# Patient Record
Sex: FEMALE | Race: WHITE | URBAN - METROPOLITAN AREA
[De-identification: names, ages, dates, MRNs, and addresses within clinical notes are randomized per-mention and may not be internally consistent; named-entity substitution may affect disease eponyms.]

---

## 2018-01-06 ENCOUNTER — HOSPITAL ENCOUNTER (EMERGENCY)
Facility: CLINIC | Age: 33
End: 2018-01-06

## 2018-01-06 ENCOUNTER — HOSPITAL ENCOUNTER (EMERGENCY)
Facility: CLINIC | Age: 33
Discharge: NURSING, REHAB OR LONG-TERM CARE | End: 2018-01-07
Attending: EMERGENCY MEDICINE | Admitting: EMERGENCY MEDICINE
Payer: COMMERCIAL

## 2018-01-06 DIAGNOSIS — F10.920 ALCOHOLIC INTOXICATION WITHOUT COMPLICATION (H): ICD-10-CM

## 2018-01-06 LAB
ALBUMIN SERPL-MCNC: 3.4 G/DL (ref 3.4–5)
ALBUMIN UR-MCNC: NEGATIVE MG/DL
ALP SERPL-CCNC: 60 U/L (ref 40–150)
ALT SERPL W P-5'-P-CCNC: 18 U/L (ref 0–50)
AMPHETAMINES UR QL SCN: NEGATIVE
ANION GAP SERPL CALCULATED.3IONS-SCNC: 7 MMOL/L (ref 3–14)
APAP SERPL-MCNC: <2 MG/L (ref 10–20)
APPEARANCE UR: CLEAR
AST SERPL W P-5'-P-CCNC: 15 U/L (ref 0–45)
BARBITURATES UR QL: NEGATIVE
BASOPHILS # BLD AUTO: 0 10E9/L (ref 0–0.2)
BASOPHILS NFR BLD AUTO: 0.2 %
BENZODIAZ UR QL: NEGATIVE
BILIRUB SERPL-MCNC: 0.1 MG/DL (ref 0.2–1.3)
BILIRUB UR QL STRIP: NEGATIVE
BUN SERPL-MCNC: 10 MG/DL (ref 7–30)
CALCIUM SERPL-MCNC: 8 MG/DL (ref 8.5–10.1)
CANNABINOIDS UR QL SCN: NEGATIVE
CHLORIDE SERPL-SCNC: 112 MMOL/L (ref 94–109)
CO2 SERPL-SCNC: 26 MMOL/L (ref 20–32)
COCAINE UR QL: NEGATIVE
COLOR UR AUTO: ABNORMAL
CREAT SERPL-MCNC: 0.64 MG/DL (ref 0.52–1.04)
DIFFERENTIAL METHOD BLD: NORMAL
EOSINOPHIL # BLD AUTO: 0.1 10E9/L (ref 0–0.7)
EOSINOPHIL NFR BLD AUTO: 2 %
ERYTHROCYTE [DISTWIDTH] IN BLOOD BY AUTOMATED COUNT: 11.9 % (ref 10–15)
ETHANOL SERPL-MCNC: 0.43 G/DL
GFR SERPL CREATININE-BSD FRML MDRD: ABNORMAL ML/MIN/1.7M2
GLUCOSE SERPL-MCNC: 123 MG/DL (ref 70–99)
GLUCOSE UR STRIP-MCNC: NEGATIVE MG/DL
HCG UR QL: NEGATIVE
HCT VFR BLD AUTO: 39.6 % (ref 35–47)
HGB BLD-MCNC: 13.7 G/DL (ref 11.7–15.7)
HGB UR QL STRIP: NEGATIVE
IMM GRANULOCYTES # BLD: 0 10E9/L (ref 0–0.4)
IMM GRANULOCYTES NFR BLD: 0.3 %
KETONES UR STRIP-MCNC: NEGATIVE MG/DL
LACTATE BLD-SCNC: 1.9 MMOL/L (ref 0.7–2)
LEUKOCYTE ESTERASE UR QL STRIP: NEGATIVE
LYMPHOCYTES # BLD AUTO: 2.3 10E9/L (ref 0.8–5.3)
LYMPHOCYTES NFR BLD AUTO: 35.1 %
MAGNESIUM SERPL-MCNC: 2.6 MG/DL (ref 1.6–2.3)
MCH RBC QN AUTO: 32.8 PG (ref 26.5–33)
MCHC RBC AUTO-ENTMCNC: 34.6 G/DL (ref 31.5–36.5)
MCV RBC AUTO: 95 FL (ref 78–100)
MONOCYTES # BLD AUTO: 0.3 10E9/L (ref 0–1.3)
MONOCYTES NFR BLD AUTO: 4.3 %
NEUTROPHILS # BLD AUTO: 3.8 10E9/L (ref 1.6–8.3)
NEUTROPHILS NFR BLD AUTO: 58.1 %
NITRATE UR QL: NEGATIVE
NRBC # BLD AUTO: 0 10*3/UL
NRBC BLD AUTO-RTO: 0 /100
OPIATES UR QL SCN: NEGATIVE
PCP UR QL SCN: NEGATIVE
PH UR STRIP: 5.5 PH (ref 5–7)
PLATELET # BLD AUTO: 251 10E9/L (ref 150–450)
POTASSIUM SERPL-SCNC: 3.2 MMOL/L (ref 3.4–5.3)
PROT SERPL-MCNC: 6.5 G/DL (ref 6.8–8.8)
RBC # BLD AUTO: 4.18 10E12/L (ref 3.8–5.2)
RBC #/AREA URNS AUTO: <1 /HPF (ref 0–2)
SALICYLATES SERPL-MCNC: <2 MG/DL
SODIUM SERPL-SCNC: 145 MMOL/L (ref 133–144)
SOURCE: ABNORMAL
SP GR UR STRIP: 1 (ref 1–1.03)
UROBILINOGEN UR STRIP-MCNC: NORMAL MG/DL (ref 0–2)
WBC # BLD AUTO: 6.5 10E9/L (ref 4–11)
WBC #/AREA URNS AUTO: 0 /HPF (ref 0–2)

## 2018-01-06 PROCEDURE — 80053 COMPREHEN METABOLIC PANEL: CPT | Performed by: EMERGENCY MEDICINE

## 2018-01-06 PROCEDURE — 80320 DRUG SCREEN QUANTALCOHOLS: CPT | Performed by: EMERGENCY MEDICINE

## 2018-01-06 PROCEDURE — 85025 COMPLETE CBC W/AUTO DIFF WBC: CPT | Performed by: EMERGENCY MEDICINE

## 2018-01-06 PROCEDURE — 25000128 H RX IP 250 OP 636: Performed by: EMERGENCY MEDICINE

## 2018-01-06 PROCEDURE — 80329 ANALGESICS NON-OPIOID 1 OR 2: CPT | Performed by: EMERGENCY MEDICINE

## 2018-01-06 PROCEDURE — 93005 ELECTROCARDIOGRAM TRACING: CPT

## 2018-01-06 PROCEDURE — 99292 CRITICAL CARE ADDL 30 MIN: CPT

## 2018-01-06 PROCEDURE — 99291 CRITICAL CARE FIRST HOUR: CPT | Mod: 25

## 2018-01-06 PROCEDURE — 96361 HYDRATE IV INFUSION ADD-ON: CPT

## 2018-01-06 PROCEDURE — 82075 ASSAY OF BREATH ETHANOL: CPT

## 2018-01-06 PROCEDURE — 96375 TX/PRO/DX INJ NEW DRUG ADDON: CPT

## 2018-01-06 PROCEDURE — 25000125 ZZHC RX 250: Performed by: EMERGENCY MEDICINE

## 2018-01-06 PROCEDURE — 83605 ASSAY OF LACTIC ACID: CPT | Performed by: EMERGENCY MEDICINE

## 2018-01-06 PROCEDURE — 83735 ASSAY OF MAGNESIUM: CPT | Performed by: EMERGENCY MEDICINE

## 2018-01-06 PROCEDURE — 96365 THER/PROPH/DIAG IV INF INIT: CPT

## 2018-01-06 PROCEDURE — 81001 URINALYSIS AUTO W/SCOPE: CPT | Performed by: EMERGENCY MEDICINE

## 2018-01-06 RX ORDER — NALOXONE HYDROCHLORIDE 1 MG/ML
2 INJECTION INTRAMUSCULAR; INTRAVENOUS; SUBCUTANEOUS ONCE
Status: COMPLETED | OUTPATIENT
Start: 2018-01-06 | End: 2018-01-06

## 2018-01-06 RX ORDER — FLUMAZENIL 0.1 MG/ML
0.5 INJECTION, SOLUTION INTRAVENOUS ONCE
Status: COMPLETED | OUTPATIENT
Start: 2018-01-06 | End: 2018-01-06

## 2018-01-06 RX ORDER — ONDANSETRON 2 MG/ML
INJECTION INTRAMUSCULAR; INTRAVENOUS
Status: DISCONTINUED
Start: 2018-01-06 | End: 2018-01-06 | Stop reason: HOSPADM

## 2018-01-06 RX ORDER — ONDANSETRON 2 MG/ML
4 INJECTION INTRAMUSCULAR; INTRAVENOUS ONCE
Status: COMPLETED | OUTPATIENT
Start: 2018-01-06 | End: 2018-01-06

## 2018-01-06 RX ORDER — POTASSIUM CL/LIDO/0.9 % NACL 10MEQ/0.1L
10 INTRAVENOUS SOLUTION, PIGGYBACK (ML) INTRAVENOUS ONCE
Status: COMPLETED | OUTPATIENT
Start: 2018-01-06 | End: 2018-01-06

## 2018-01-06 RX ORDER — NALOXONE HYDROCHLORIDE 1 MG/ML
INJECTION INTRAMUSCULAR; INTRAVENOUS; SUBCUTANEOUS
Status: DISCONTINUED
Start: 2018-01-06 | End: 2018-01-06 | Stop reason: HOSPADM

## 2018-01-06 RX ORDER — SODIUM CHLORIDE 9 MG/ML
1000 INJECTION, SOLUTION INTRAVENOUS CONTINUOUS
Status: DISCONTINUED | OUTPATIENT
Start: 2018-01-06 | End: 2018-01-07 | Stop reason: HOSPADM

## 2018-01-06 RX ADMIN — SODIUM CHLORIDE 1000 ML: 9 INJECTION, SOLUTION INTRAVENOUS at 18:26

## 2018-01-06 RX ADMIN — ONDANSETRON 4 MG: 2 INJECTION INTRAMUSCULAR; INTRAVENOUS at 18:53

## 2018-01-06 RX ADMIN — Medication 10 MEQ: at 20:27

## 2018-01-06 RX ADMIN — NALOXONE HYDROCHLORIDE 2 MG: 1 INJECTION PARENTERAL at 18:18

## 2018-01-06 RX ADMIN — SODIUM CHLORIDE 1000 ML: 9 INJECTION, SOLUTION INTRAVENOUS at 18:41

## 2018-01-06 RX ADMIN — FLUMAZENIL 0.5 MG: 0.1 INJECTION, SOLUTION INTRAVENOUS at 18:24

## 2018-01-06 NOTE — ED AVS SNAPSHOT
` ` Patient Information     Patient Name Sex     Delaney Rodriges ([id number]) Female 1985       Room Bed    BH2 BH2      Patient Demographics     Address Phone    [address] [place] [id number] [phone] (Home)  NONE (Work)  [phone] (Mobile) *Preferred*      Patient Ethnicity & Race     Ethnic Group Patient Race    American White      PCP and Center    Primary Care Provider  Phone Center     Valorie Dos Santos  [phone] Ofelia Feliz [address] [address]        Emergency Contact(s)     Name Relation Home Work Mobile    NANCY [facility] Friend [phone] NONE [phone]      Documents on File        Status Date Received Description       Documents for the Patient    Consent for EHR Access Received 18     Insurance Card       External Medication Information Consent       Patient ID       [facility] Specified Other       Consent for Services/Privacy Notice - Hospital/Clinic Received 18     Privacy Notice - [place] Received 18     Care Everywhere Prospective Auth Received 18        Documents for the Encounter    CMS IM for Patient Signature         Admission Information     Attending Provider Admitting Provider Admission Type Admission Date/Time    Mehreen Daley MD  Emergency 18  1819    Discharge Date Hospital Service Auth/Cert Status Service Area     Emergency Medicine [facility]    Unit Room/Bed Admission Status        EMERGENCY DEPT BH2/2 Admission (Confirmed)       Admission     Complaint    None      Hospital Account     Name Acct ID Class Status Primary Coverage    Delaney Rodriges [id number] Emergency [facility] access hospital OPEN ACCESS FULLY INSURED            Guarantor Account (for Hospital Account #[id number])     Name Relation to Pt Service Area Active? Acct Type    Delaney Rodriges Self FCS Yes Personal/Family    Address Phone          [address] [place] [id number] [phone](H)              Coverage Information  (for Hospital Account #[id number])     F/O Payor/Plan Precert #    [employer] OPEN ACCESS FULLY INSURED     Subscriber Subscriber #    Delaney Rodriges [id number]    Address Phone    [address]  [place] [address] [phone]

## 2018-01-07 VITALS
BODY MASS INDEX: 21.25 KG/M2 | HEIGHT: 63 IN | SYSTOLIC BLOOD PRESSURE: 101 MMHG | OXYGEN SATURATION: 96 % | TEMPERATURE: 97.8 F | WEIGHT: 119.93 LBS | RESPIRATION RATE: 19 BRPM | DIASTOLIC BLOOD PRESSURE: 64 MMHG

## 2018-01-07 NOTE — ED NOTES
DATE:  1/6/2018   TIME OF RECEIPT FROM LAB:  8:13 PM  LAB TEST:  ETOH  LAB VALUE:  0.43  RESULTS GIVEN WITH READ-BACK TO (PROVIDER):  Ana Ambriz MD  TIME LAB VALUE REPORTED TO PROVIDER:   8:13 PM

## 2018-01-07 NOTE — ED NOTES
"[place] [employer] requesting pt's name and birth date. Writer asked pt if she was okay with them receiving that information and she nodded her head yes, and states \"sure.\"   "

## 2018-01-07 NOTE — ED NOTES
Patient placed on the list at [address] for DETOX.  Pt given the option of DETOX or calling sister now to arrange a ride home.  Pt made aware by writer she was on the list for transfer.  Pt agreed to call sister.  Writer assisted with phone call.

## 2018-01-07 NOTE — ED NOTES
"Writer woke patient to ambulate and discuss DC plan.  Patient states she is staying with her sister in [place] who she thinks called 911.  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  Pt recently went through treatment in [place]. Pt off monitor and ambulated independently to the bathroom with escort by RN for safety.  Gait slightly unsteady. Given pitcher of ice water, picking at breakfast, and laid back down.  Pt agitated when writer discussed her calling her sister for a ride, \"I won't be able to do that until later today\" Writer clarified we can't board patients in the ED all day and pt ignored the statement and refused to call sister at this time.  Dr. Daley notified.    "

## 2018-01-07 NOTE — ED NOTES
Writer walked into room and introduced self to patient. She woke easily.to verbal stimulus.  Clarified with patient plan for DC with sober ride home. Patient in agreement.  Resting and set up with hot breakfast

## 2018-01-07 NOTE — ED PROVIDER NOTES
Sign Out Note    Pt accepted in sign out from: Dr. Ambriz    Briefly pt presented to the ED for: etoh    Plan at time of sign out: await sobriety    Care of patient during my shift: no issues. Pt was taken out of restraints before 2am. Pt cooperative. Pt much more sober and cooperative. Denies any SI or depression.    Plan for patient at this time: await sobriety. Pt will be breathalyzed at 630 by RN and if sober can be discharged. Care of pt will be signed out to Dr. Daley, morning physician.     Joel Arreola, DO  01/07/18 0428

## 2018-01-07 NOTE — ED PROVIDER NOTES
Received patient in sign out.  Patient with independent gait but mildly unsteady.  Patient sister refuses to provide a safe sober ride.  Patient will be sent to [address] [place] [address] for detox.       Mehreen Daley MD  01/07/18 0946

## 2018-01-07 NOTE — ED NOTES
"Patient waking up and pulling at tubes. Patient redirected and reoriented on location. Patient states her name is Delaney and states \"Fuck you\" when asked what her last name is. Bilateral soft restraints applied to both wrists. Dr. Ambriz at bedside.  "

## 2018-01-07 NOTE — ED NOTES
Patient continuing to struggle against soft restraints and attempting to kick staff and get up. Dr. Ambriz ordered restraints violent or self destructive adult.

## 2018-01-07 NOTE — DISCHARGE INSTRUCTIONS
"  Alcohol Intoxication  Alcohol intoxication occurs when you drink alcohol faster than your liver can remove it from your system. The following facts are important to remember:    It can take 10 minutes or more to start to feel the effects of a drink, so you can easily get more intoxicated than you intended.    One drink may be more than 1 serving of alcohol. Depending on the drink, it can be 2 to 4 servings.    It takes about an hour for your body to metabolize (clear) 1 serving. If you have more than 1 drink, it can take a couple of hours or more.    Many things affect how drinks will affect you, including whether you ve eaten, how fast you drink, your size, how much you normally drink (or not), medicines you take, chronic diseases you have, and gender.  Signs and symptoms of alcohol poisoning  The following are signs and symptoms of alcohol poisoning:  Mild impairment    Reduced inhibitions    Slurred speech    Drowsiness    Decreased fine motor skills  Moderate impairment    Erratic behavior, aggression, depression    Impaired judgment    Confusion    Concentration difficulties    Coordination problems  Severe impairment    Vomiting    Seizures    Unconsciousness    Cold, clammy    Slow or irregular breathing    Hypothermia (low body temperature)    Coma  Health effects  Alcohol abuse causes health problems. Sometimes this can happen after only drinking a  little.\" There is no set number of drinks or amount of alcohol that defines too much. The more you drink at one time, and the more frequently you drink determine both the short-term and long-term health effects. It affects all parts of your body and your health, including your:    Brain. Alcohol is a central nervous system depressant. It can damage parts of the brain that affect your balance, memory, thinking, and emotions. It can cause memory loss, blackouts, depression, agitation, sleep cycle changes, and seizures. These changes may or may not be " reversible.    Heart and vascular system. Alcohol affects multiple areas. It can damage heart muscle causing cardiomyopathy, which is a weakening and stretching of the heart muscle. This can lead to trouble breathing, an irregular heartbeat, atrial fibrillation, leg swelling, and heart failure. It makes the blood vessels stiffen causing hypertension (high blood pressure). All of these problems increase your risk of having heart attacks or strokes.    Liver. Alcohol causes fat to build up in the liver, affecting its normal function. This increases the risk for hepatitis, leading to abdominal pain, appetite loss, jaundice, bleeding problems, liver fibrosis, and cirrhosis. This in turn can affect your ability to fight off infections, and can cause diabetes. The liver changes prevent it from removing toxins in your blood that can cause encephalopathy. Signs of this are confusion, altered level of consciousness, personality changes, memory loss, seizures, coma, and death.    Pancreas. Alcohol can cause inflammation of the pancreas, or pancreatitis. This can cause pain in your abdomen, fever, and diabetes.    Immune system. Alcohol weakens your immune system in a number of ways. It suppresses your immune system making it harder to fight off infections and colds. You will also have a higher risk of certain infections like pneumonia and tuberculosis.    Cancer risk. Alcohol raises your risk of cancer of the mouth, esophagus, pharynx, larynx, liver, and breast.    Sexual function. Alcohol abuse can also lead to sexual problems.  Alcohol use during pregnancy may cause permanent damage to the growing baby.  Home care  The following guidelines will help you care for yourself at home:    Don't drink any more alcohol.    Don't drive until all effects of the alcohol have worn off.    Don't operate machinery that can cause injuries.    Get lots of rest over the next few days. Drink plenty of water and other non-alcoholic liquids.  Try to eat regular meals.    If you have been drinking heavily on a daily basis, you may go through alcohol withdrawal. The usual symptoms last 3 to 4 days and may include nervousness, shakiness, nausea, sweating, sleeplessness, and can even cause seizures and a serious withdrawal symptom called delirium tremens, or DTs. During this time, it is best that you stay with family or friends who can help and support you. You can also admit yourself to a residential detox program. If your symptoms are severe (seizures, severe shakiness, confusion), contact your doctor or call an ambulance for help (see below).   Follow-up care  If alcohol is a problem in your life, these are some organizations that can help you:    Alcoholics Anonymous offers support through a self-help fellowship. There are no dues or fees. See the Yellow Pages and call for time and place of meetings. Find AA online at www.aa.org.    [employer] offers support to families of alcohol users. Contact 360-894-0787, or online at www.al-anolatoya.org.    National [place] on Alcoholism and Drug Dependence can be reached at 850-515-3615, or online at www.ncadd.org.    There are also inpatient and residential alcohol detox programs. Check the Internet or phonebook Yellow Pages under  Drug Abuse and Treatment Centers.   Call 911  Call 911 if any of these occur:    Trouble breathing or slow irregular breathing    Chest pain    Sudden weakness on one side of your body or sudden trouble speaking    Heavy bleeding or vomiting blood    Very drowsy or trouble awakening    Fainting or loss of consciousness    Rapid heart rate    Seizure  When to seek medical advice  Call your healthcare provider right away if any of these occur:    Severe shakiness     Fever over 100.4  F (38.0  C)    Confusion or hallucinations (seeing, hearing, or feeling things that are not there)    Pain in your upper abdomen that gets worse    Repeated vomiting  Date Last Reviewed: 6/1/2016 2000-2017 The  [employer]. [address] [place] [address], [place] [address]. All rights reserved. This information is not intended as a substitute for professional medical care. Always follow your healthcare professional's instructions.

## 2018-01-07 NOTE — ED PROVIDER NOTES
"  History     Chief Complaint:  Altered Mental Status    The history is provided by the EMS personnel. The history is limited by the condition of the patient (unconscious).      \"Erin Jama\" is a ~29 year old female who presents via EMS as an unconscious Erin Jama. EMS report that the patient walked into the 2nd floor apartment of strangers, began taking off her clothes, and proceeded to lose consciousness. EMS state that the non-acquaintances who found her were moving a TV into their apartment and left the door opening, inadvertently allowing the patient to enter. EMS note that she did not appear to have been outside prior to walking into the apartment as she was notably warm to the touch. EMS note that she smelled a \"little alcoholy\" on arrival. Police were notified.    Allergies:  Unable to verify    Medications:    Unable to verify    Past Medical History:    Unable to verify    Past Surgical History:    Unable to verify    Family History:    Unable to verify    Social History:  Presents via EMS  PCP: No primary care provider on file.       Review of Systems   Unable to perform ROS: Mental status change       Physical Exam     Patient Vitals for the past 24 hrs:   BP Temp Temp src Heart Rate Resp SpO2   01/06/18 1836 - - - 78 24 94 %   01/06/18 1831 - - - 90 27 96 %   01/06/18 1830 98/55 - - 75 24 98 %   01/06/18 1829 - - - 74 24 98 %   01/06/18 1828 99/57 97.8  F (36.6  C) Temporal 74 25 98 %        Physical Exam     Nursing note and vitals reviewed.  General: Patient is obtunded but opening her eyes and moving all 4 extremities.  HEENT: No evidence of trauma to her face or scalp.  No blood from the nose or ears.  Eyes: Pupils are equal and reactive, not small.  She has a scar on her left eyelid.  Musculoskeletal: Well-developed muscularly, no edema, no obvious bruising or evidence of trauma, no tract marks.  Neuro: GCS of 10, opens her eyes spontaneously, localizes pain, will not talk.  Smells of alcohol.  Moves " all extremities equally and has good strength.  Skin: No bruising, no scrapes, no jaundice, no IV drug tracts.  Pulmonary: Lungs are clear.  Moves air well, saturations are normal.  Cardiovascular: Heart sounds are normal, strong radial pulses, normal capillary refill, no peripheral cyanosis.  No chest wall tenderness.  Abdomen: No tenderness or distention, no organomegaly.    Emergency Department Course     ECG (18:19:51):  Rate 86 bpm. MS interval 136. QRS duration 88. QT/QTc 372/445. P-R-T axes 26 35 43. Normal sinus rhythm. Normal ECG. Agree with computer interpretation. Interpreted at 1820 by Ana Ambriz MD.     Laboratory:  CBC: AWNL (WBC 6.5, HGB 13.7, )  CMP: Na 145 (H), Potassium 3.2 (L), Cl 112 (H),  (H), Ca 8.0 (L) o/w WNL (Creatinine 0.64)  Lactic Acid: 1.9  Magnesium: 2.6 (H)  Salicylate: <2  Acetaminophen: <2  EtOH: 0.43 (HH)    Drug abuse screen 77 urine: Negative  HCG Qualitative Urine: Negative  UA with micro: Spec Grav 1.001 (L) o/w negative      Interventions:  Medications   0.9% sodium chloride BOLUS (0 mLs Intravenous Stopped 1/6/18 2003)     Followed by   0.9% sodium chloride infusion (1,000 mLs Intravenous Not Given 1/6/18 1841)   naloxone (NARCAN) injection 2 mg ( Intravenous Canceled Entry 1/6/18 1819)   flumazenil (ROMAZICON) injection 0.5 mg (0.5 mg Intravenous Given 1/6/18 1824)   0.9% sodium chloride BOLUS (0 mLs Intravenous Stopped 1/6/18 2004)   ondansetron (ZOFRAN) injection 4 mg (4 mg Intravenous Given 1/6/18 1853)   potassium chloride 10 mEq in 100 mL intermittent infusion with 10 mg lidocaine (0 mEq Intravenous Stopped 1/6/18 2127)     1818: Naloxone 2 mg IV  1824: Romazicon 0.5 mg IV   1826: NS 1L IV Bolus    1841: NS 1L IV Bolus    1853: Zofran 4 mg IV  2027: Potassium chloride 10 mEq in 10 mL II w/ 10 mg Lidocaine   The patient's symptoms were not improved with parenteral Narcan or Romazicon.    Emergency Department Course:  Past medical records, nursing  notes, and vitals reviewed.    1814: Presents with EMS.  1815: Moved feet, opened eyes.  1817: I entered the room and attempted to rouse the patient unsuccessfully.  1817: I ordered 2mL Narcan administration followed by 0.5 mg Romazicon.  1818: Pupils were found to be reactive.  1819: EKG taken.  1819: I smelled alcohol on the patient's breath.  1824: Romazicon 0.5 mg IV administered  1829: I rechecked the patient. No update.  1830: Blood drawn for testing.  1836: Urine collected for urinalysis  1841: Zofran 4 mg IV administered  1851: I rechecked the patient. Patient is somewhat conscious but unable to speak.  1854: Patient waking up and pulling at tubes and combative. Patient states her name is Delaney.  1900: Patient evaluated face to face, is a danger to self and staff. Therefore restraints ordered.  1913: Patient transferred to  room 2.  1941: Blood drawn for Salicylate and Acetaminophen labs  2023: I rechecked the patient. Patient remains unable to speak.  2200: Checked on patient status, restraints remain necessary for patient safety.  2349: Checked on patient status, restraints remain necessary for patient safety.      Impression & Plan         Medical Decision Making:  Patient comes into the stabilization room after some bizarre behavior at an apartment complex and altered mental status.  She will open her eyes but will not talk.  She smells strongly of alcohol.  She was given Narcan after arrival and Romazicon without any benefit.  Labs were drawn and a urine specimen obtained.  She was oxygenating well, her GCS was 10 when she came in mainly because she was not saying anything.  She opened her eyes spontaneously, she localized pain and she was moving around on the bed.  Her blood alcohol did come back at 0.43, potassium was low at 3.2 and her lactic acid was normal at 1.9.  Salicylate Tylenol levels were negligible.  She was given a liter of normal saline and then 10 mEq of IV potassium because of her low  potassium.  She was placed in 5 point restraints after arrival because she was trying to pull off her clothes and pull her IV out.  She was given a liter of normal saline and moved to a behavioral health bed.  I checked on her regularly and she started waking up more.  She had to go to the bathroom and was assisted there but still could not walk on her own.  I kept her in 5 point restraints because of her risk of trying to get out of bed and falling and hurting herself.  Until she betzaida up enough to be able to talk and follow commands, she will stay in restraints.  Her drug screen came back negative and pregnancy test negative.  CBC is normal.  I will sign her out to my partner for overnight so she will board here.  She will need to be evaluated every 3-4 hours and eventually will be able to come out of her restraints.  I reevaluated the patient and now she gave me her name and birthdate.  She lives in [place] which is near [place].  She still is not safe to be out of restraints as she does not have any insight as to why she is here and I am concerned she could fall and injure herself.  She does state she was not intentionally drinking to kill herself but this will need to be reevaluated as she betzaida up even more overnight.  Critical Care time was 70 minutes for this patient excluding procedures.    Diagnosis:    ICD-10-CM    1. Alcoholic intoxication without complication (H) F10.920        Disposition:  Patient will board overnight to sober up. Dr. Arreola will periodically evaluate her and determine her need for restraints.  When she is more awake, will need to make sure that she is not suicidal and intending self-harm with the alcohol.    Discharge Medications:  New Prescriptions    No medications on file       I, Kerwin Callejas, am serving as a scribe at 6:12 PM on 1/6/2018 to document services personally performed by Ana Ambriz MD based on my observations and the provider's statements to me.     1/6/2018    EMERGENCY DEPARTMENT     Ana Ambriz MD  01/07/18 0013

## 2018-01-07 NOTE — ED NOTES
Pt has been resting comfortably, repositions self independently.  On transport hold. VSS Waiting HE for transfer to [facility]OX.  Report to [facility]ie [facility]TAL